# Patient Record
Sex: FEMALE | Race: WHITE | NOT HISPANIC OR LATINO | Employment: FULL TIME | URBAN - METROPOLITAN AREA
[De-identification: names, ages, dates, MRNs, and addresses within clinical notes are randomized per-mention and may not be internally consistent; named-entity substitution may affect disease eponyms.]

---

## 2021-12-07 ENCOUNTER — HOSPITAL ENCOUNTER (EMERGENCY)
Facility: CLINIC | Age: 61
Discharge: HOME | End: 2021-12-07
Attending: EMERGENCY MEDICINE | Admitting: EMERGENCY MEDICINE
Payer: OTHER GOVERNMENT

## 2021-12-07 VITALS
BODY MASS INDEX: 36.68 KG/M2 | TEMPERATURE: 97.5 F | RESPIRATION RATE: 20 BRPM | HEIGHT: 63 IN | SYSTOLIC BLOOD PRESSURE: 106 MMHG | DIASTOLIC BLOOD PRESSURE: 61 MMHG | HEART RATE: 92 BPM | WEIGHT: 207 LBS | OXYGEN SATURATION: 99 %

## 2021-12-07 DIAGNOSIS — I48.91 ATRIAL FIBRILLATION WITH RAPID VENTRICULAR RESPONSE (H): ICD-10-CM

## 2021-12-07 LAB
ANION GAP SERPL CALCULATED.3IONS-SCNC: 8 MMOL/L (ref 3–14)
ATRIAL RATE - MUSE: 101 BPM
ATRIAL RATE - MUSE: 153 BPM
BASOPHILS # BLD AUTO: 0.1 10E3/UL (ref 0–0.2)
BASOPHILS NFR BLD AUTO: 1 %
BUN SERPL-MCNC: 16 MG/DL (ref 7–30)
CALCIUM SERPL-MCNC: 8.8 MG/DL (ref 8.5–10.1)
CHLORIDE BLD-SCNC: 104 MMOL/L (ref 94–109)
CO2 SERPL-SCNC: 25 MMOL/L (ref 20–32)
CREAT SERPL-MCNC: 0.84 MG/DL (ref 0.52–1.04)
DIASTOLIC BLOOD PRESSURE - MUSE: NORMAL MMHG
DIASTOLIC BLOOD PRESSURE - MUSE: NORMAL MMHG
EOSINOPHIL # BLD AUTO: 0.2 10E3/UL (ref 0–0.7)
EOSINOPHIL NFR BLD AUTO: 2 %
ERYTHROCYTE [DISTWIDTH] IN BLOOD BY AUTOMATED COUNT: 13.2 % (ref 10–15)
GFR SERPL CREATININE-BSD FRML MDRD: 74 ML/MIN/1.73M2
GLUCOSE BLD-MCNC: 355 MG/DL (ref 70–99)
HCT VFR BLD AUTO: 47.4 % (ref 35–47)
HGB BLD-MCNC: 15 G/DL (ref 11.7–15.7)
IMM GRANULOCYTES # BLD: 0 10E3/UL
IMM GRANULOCYTES NFR BLD: 0 %
INTERPRETATION ECG - MUSE: NORMAL
INTERPRETATION ECG - MUSE: NORMAL
LYMPHOCYTES # BLD AUTO: 3.1 10E3/UL (ref 0.8–5.3)
LYMPHOCYTES NFR BLD AUTO: 32 %
MCH RBC QN AUTO: 28.5 PG (ref 26.5–33)
MCHC RBC AUTO-ENTMCNC: 31.6 G/DL (ref 31.5–36.5)
MCV RBC AUTO: 90 FL (ref 78–100)
MONOCYTES # BLD AUTO: 0.5 10E3/UL (ref 0–1.3)
MONOCYTES NFR BLD AUTO: 5 %
NEUTROPHILS # BLD AUTO: 5.9 10E3/UL (ref 1.6–8.3)
NEUTROPHILS NFR BLD AUTO: 60 %
NRBC # BLD AUTO: 0 10E3/UL
NRBC BLD AUTO-RTO: 0 /100
P AXIS - MUSE: 28 DEGREES
P AXIS - MUSE: NORMAL DEGREES
PLATELET # BLD AUTO: 312 10E3/UL (ref 150–450)
POTASSIUM BLD-SCNC: 3.4 MMOL/L (ref 3.4–5.3)
PR INTERVAL - MUSE: 172 MS
PR INTERVAL - MUSE: NORMAL MS
QRS DURATION - MUSE: 78 MS
QRS DURATION - MUSE: 84 MS
QT - MUSE: 314 MS
QT - MUSE: 352 MS
QTC - MUSE: 456 MS
QTC - MUSE: 475 MS
R AXIS - MUSE: -1 DEGREES
R AXIS - MUSE: -6 DEGREES
RBC # BLD AUTO: 5.26 10E6/UL (ref 3.8–5.2)
SODIUM SERPL-SCNC: 137 MMOL/L (ref 133–144)
SYSTOLIC BLOOD PRESSURE - MUSE: NORMAL MMHG
SYSTOLIC BLOOD PRESSURE - MUSE: NORMAL MMHG
T AXIS - MUSE: 167 DEGREES
T AXIS - MUSE: 99 DEGREES
VENTRICULAR RATE- MUSE: 101 BPM
VENTRICULAR RATE- MUSE: 138 BPM
WBC # BLD AUTO: 9.8 10E3/UL (ref 4–11)

## 2021-12-07 PROCEDURE — 96374 THER/PROPH/DIAG INJ IV PUSH: CPT

## 2021-12-07 PROCEDURE — 96361 HYDRATE IV INFUSION ADD-ON: CPT

## 2021-12-07 PROCEDURE — 93005 ELECTROCARDIOGRAM TRACING: CPT

## 2021-12-07 PROCEDURE — 92960 CARDIOVERSION ELECTRIC EXT: CPT

## 2021-12-07 PROCEDURE — 85025 COMPLETE CBC W/AUTO DIFF WBC: CPT | Performed by: EMERGENCY MEDICINE

## 2021-12-07 PROCEDURE — 272N000240 HC CARDIOVERT/DEFIB/PACER SUPP

## 2021-12-07 PROCEDURE — 80048 BASIC METABOLIC PNL TOTAL CA: CPT | Performed by: EMERGENCY MEDICINE

## 2021-12-07 PROCEDURE — 36415 COLL VENOUS BLD VENIPUNCTURE: CPT | Performed by: EMERGENCY MEDICINE

## 2021-12-07 PROCEDURE — 99285 EMERGENCY DEPT VISIT HI MDM: CPT | Mod: 25

## 2021-12-07 PROCEDURE — 250N000011 HC RX IP 250 OP 636: Performed by: EMERGENCY MEDICINE

## 2021-12-07 PROCEDURE — 258N000003 HC RX IP 258 OP 636: Performed by: EMERGENCY MEDICINE

## 2021-12-07 RX ORDER — PROPOFOL 10 MG/ML
30 INJECTION, EMULSION INTRAVENOUS ONCE
Status: DISCONTINUED | OUTPATIENT
Start: 2021-12-07 | End: 2021-12-07

## 2021-12-07 RX ORDER — PROPOFOL 10 MG/ML
50 INJECTION, EMULSION INTRAVENOUS
Status: DISCONTINUED | OUTPATIENT
Start: 2021-12-07 | End: 2021-12-07 | Stop reason: HOSPADM

## 2021-12-07 RX ORDER — PROPOFOL 10 MG/ML
25 INJECTION, EMULSION INTRAVENOUS
Status: DISCONTINUED | OUTPATIENT
Start: 2021-12-07 | End: 2021-12-07 | Stop reason: HOSPADM

## 2021-12-07 RX ADMIN — SODIUM CHLORIDE 500 ML: 9 INJECTION, SOLUTION INTRAVENOUS at 02:07

## 2021-12-07 RX ADMIN — PROPOFOL 70 MG: 10 INJECTION, EMULSION INTRAVENOUS at 02:33

## 2021-12-07 ASSESSMENT — ENCOUNTER SYMPTOMS
LIGHT-HEADEDNESS: 1
SHORTNESS OF BREATH: 1
PALPITATIONS: 1

## 2021-12-07 ASSESSMENT — MIFFLIN-ST. JEOR: SCORE: 1463.08

## 2021-12-07 NOTE — ED TRIAGE NOTES
Here for palpitations started at 12:40am. Is currently on xeralto. Stated history of A-fib that would require getting shock. ABCs intact.

## 2021-12-07 NOTE — ED PROVIDER NOTES
History   Chief Complaint:  Palpitations     HPI   Alisia Johnson is a 61 year old female with history of atrial fibrillation on Xarelto  who presents with palpitations. The patient reports that she was awake tonight at 0040 today when she began suddenly having palpitations. The patient has a history of atrial fibrillation and states that her palpitations feel similar to past episodes of atrial fibrillation. She has been cardioverted in the past for atrial fibrillation and is currently taking Xarelto. She also mentions having some lightheadedness and shortness of breath. Her  mentions that there has been some increased family stress recently and the patient recently got a Yoan [employer] Yoan COVID-19 booster vaccination 4 days go. The patient sees Dr. Villeda with [facility] for her cardiology care and has an appointment with them in January of next year.     Review of Systems   Respiratory: Positive for shortness of breath.    Cardiovascular: Positive for palpitations.   Neurological: Positive for light-headedness.   All other systems reviewed and are negative.        Allergies:  Pcn [Penicillins]    Medications:  Albuterol  Atenolol  Pulmicort  Zyrtec  Lexapro  Levothyroxine   Victoza  Deltasone  Xarelto  Crestor    Past Medical History:     Allergic rhinitis  Anxiety state  Atrial fibrillation  C. difficile   Cardiac arrhythmia  Depressive disorder  Asthma  Thyroid disease   Type II diabetes   Hyperlipidemia    Past Surgical History:    Cardioversion  Gallbladder removal  Tonsillectomy and adenoidectomy    Family History:    Mother: coronary artery disease, cataract, depression, diabetes  Father: cardiovascular, alcohol/drug abuse, arthritis  Brother: alcohol abuse, coronary artery disease, cancer    Social History:  The patient presents with her .   She is a retired nurse practitioner.     Physical Exam     Patient Vitals for the past 24 hrs:   BP Temp Temp src Pulse Resp SpO2 Height  "Weight   12/07/21 0300 113/64 -- -- 94 12 94 % -- --   12/07/21 0255 110/63 -- -- 93 14 91 % -- --   12/07/21 0250 124/84 -- -- 89 18 94 % -- --   12/07/21 0245 123/83 -- -- 95 13 -- -- --   12/07/21 0240 110/80 -- -- 98 12 94 % -- --   12/07/21 0235 124/87 -- -- 101 10 93 % -- --   12/07/21 0230 123/79 -- -- 101 20 96 % -- --   12/07/21 0225 91/78 -- -- (!) 137 27 98 % -- --   12/07/21 0220 (!) 156/104 -- -- (!) 149 (!) 53 98 % -- --   12/07/21 0200 (!) 151/121 -- -- (!) 142 29 97 % 1.6 m (5' 3\") 93.9 kg (207 lb)   12/07/21 0145 (!) 159/116 -- -- (!) 140 -- 95 % -- --   12/07/21 0135 (!) 158/105 -- -- (!) 130 -- -- -- --   12/07/21 0134 -- 97.5  F (36.4  C) Oral -- 18 96 % -- --       Physical Exam    HENT:  mmm, no rhinorrhea  Eyes: periorbital tissues and sclera normal   Neck: supple, no abnormal swelling  Lungs:  CTAB,  no resp distress  CV: irreg irreg, no m/r/g, ppi  Abd: soft, nontender, nondistended, no rebound/masses/guarding/hsm  Ext: no peripheral edema  Skin: warm, dry, well perfused, no rashes/bruising/lesions on exposed skin  Neuro: alert, MAEE, no gross motor or sensory deficits, gait stable  Psych: Normal mood, normal affect      Emergency Department Course   ECG  ECG obtained at 0152, ECG read at 0158  Atrial fibrillation with rapid ventricular response  Inferior-posterior infarct, possibly acute  ST & T wave abnormality   New atrial fibrillation as compared to prior, dated 09/05/2015 at 1156, but similar to prior dated 09/05/2021 at 1058.  Rate 138 bpm. WA interval * ms. QRS duration 84 ms. QT/QTc 314/475 ms. P-R-T axes * -1 167.     ECG #2  ECG taken at 0228, ECG read at 0232  Sinus tachycardia  Nonspecific ST and T wave abnormality   Rate 101 bpm. WA interval 172 ms. QRS duration 78 ms. QT/QTc 352/456ms. P-R-T axes 28 -6 99.     Laboratory:  CBC: WBC 9.8, HGB 15.0,    BMP: Glucose 355 (H) o/w WNL (Creatinine 0.84)      [facility]    -Cardioversion " External    Date/Time: 12/7/2021 1:54 AM  Performed by: Jordan Mathew MD  Authorized by: Jordan Mathew MD     Risks, benefits and alternatives discussed.    ED EVALUATION:      Assessment Time: 12/7/2021 1:55 AM      I have performed an Emergency Department Evaluation including taking a history and physical examination, this evaluation will be documented in the electronic medical record for this ED encounter.      ASA Class: Class 2- mild systemic disease, no acute problems, no functional limitations    Mallampati: Grade 1- soft palate, uvula, tonsillar pillars, and posterior pharyngeal wall visible    NPO Status: appropriately NPO for procedure    UNIVERSAL PROTOCOL   Site Marked: NA  Prior Images Obtained and Reviewed:  NA  Required items: Required blood products, implants, devices and special equipment available    Patient identity confirmed:  Verbally with patient and arm band  Patient was reevaluated immediately before administering moderate or deep sedation or anesthesia  Confirmation Checklist:  Patient's identity using two indicators, relevant allergies, procedure was appropriate and matched the consent or emergent situation and correct equipment/implants were available  Time out: Immediately prior to the procedure a time out was called    Universal Protocol: the Joint Commission Universal Protocol was followed    Preparation: Patient was prepped and draped in usual sterile fashion    ESBL (mL):  0    SEDATION  Patient Sedated: Yes    Sedation Type:  Deep and moderate (conscious) sedation  Sedation:  Propofol and see MAR for details  Vital signs: Vital signs monitored during sedation      PRE-PROCEDURE DETAILS:     Cardioversion basis:  Emergent    Rhythm:  Atrial fibrillation    Electrode placement:  Anterior-posterior  Attempt one:     Cardioversion mode:  Synchronous    Waveform:  Biphasic    Shock (Joules):  150    Shock outcome:  Conversion to normal sinus rhythm  Post-procedure  details:     Patient status:  Awake      PROCEDURE    Patient Tolerance:  Patient tolerated the procedure well with no immediate complications  Length of time physician/provider present for 1:1 monitoring during sedation: 15        Emergency Department Course:    Reviewed:  I reviewed nursing notes, vitals, past medical history and Care Everywhere    Assessments:  0146 I obtained history and examined the patient as noted above.   0220 I rechecked the patient and explained findings. I performed a cardioversion as documented above.    0257 I rechecked and updated the patient.      Interventions:  0207 NS, 0.5 L, IV bolus   0224 Diprivan 50 mg IV   0225 Diprivan 20 mg IV     Disposition:  The patient was discharged to home.     Impression & Plan       Medical Decision Makin-year-old female chronically anticoagulated due to history of paroxysmal atrial fibrillation here with recurrent episode of atrial fibrillation with rapid ventricular response.  No hemodynamic instability.  Would prefer elective cardioversion which was done here without complication and single shock success.  Post EKG confirms restoration of normal sinus rhythm.  No clear provoking metabolic derangement.  Discharge home have her follow-up with her outpatient cardiologist to the [facility].  She was comfortable and agreeable with that plan.    Diagnosis:    ICD-10-CM    1. Atrial fibrillation with rapid ventricular response (H)  I48.91        Discharge Medications:  None     Scribe Disclosure:  Jorge GREENE, am serving as a scribe at 1:38 AM on 2021 to document services personally performed by Jordan Mathew MD based on my observations and the provider's statements to me.              Jordan Mathew MD  21 032